# Patient Record
Sex: MALE | Race: WHITE | NOT HISPANIC OR LATINO | URBAN - METROPOLITAN AREA
[De-identification: names, ages, dates, MRNs, and addresses within clinical notes are randomized per-mention and may not be internally consistent; named-entity substitution may affect disease eponyms.]

---

## 2024-08-13 ENCOUNTER — TELEPHONE (OUTPATIENT)
Dept: TRANSPLANT | Facility: CLINIC | Age: 44
End: 2024-08-13
Payer: MEDICARE

## 2024-08-13 DIAGNOSIS — I50.9 CONGESTIVE HEART FAILURE, UNSPECIFIED HF CHRONICITY, UNSPECIFIED HEART FAILURE TYPE: Primary | ICD-10-CM

## 2024-10-30 ENCOUNTER — OFFICE VISIT (OUTPATIENT)
Dept: CARDIOLOGY | Facility: CLINIC | Age: 44
End: 2024-10-30
Payer: MEDICARE

## 2024-10-30 ENCOUNTER — LAB VISIT (OUTPATIENT)
Dept: LAB | Facility: HOSPITAL | Age: 44
End: 2024-10-30
Attending: INTERNAL MEDICINE
Payer: MEDICARE

## 2024-10-30 VITALS
BODY MASS INDEX: 33.56 KG/M2 | OXYGEN SATURATION: 96 % | WEIGHT: 247.81 LBS | HEIGHT: 72 IN | DIASTOLIC BLOOD PRESSURE: 80 MMHG | SYSTOLIC BLOOD PRESSURE: 130 MMHG | HEART RATE: 60 BPM

## 2024-10-30 DIAGNOSIS — I48.0 PAROXYSMAL ATRIAL FIBRILLATION: Primary | ICD-10-CM

## 2024-10-30 DIAGNOSIS — I50.9 CONGESTIVE HEART FAILURE, UNSPECIFIED HF CHRONICITY, UNSPECIFIED HEART FAILURE TYPE: ICD-10-CM

## 2024-10-30 DIAGNOSIS — I50.20 HFREF (HEART FAILURE WITH REDUCED EJECTION FRACTION): Primary | ICD-10-CM

## 2024-10-30 LAB
ALBUMIN SERPL BCP-MCNC: 3.5 G/DL (ref 3.5–5.2)
ALP SERPL-CCNC: 69 U/L (ref 40–150)
ALT SERPL W/O P-5'-P-CCNC: 41 U/L (ref 10–44)
ANION GAP SERPL CALC-SCNC: 7 MMOL/L (ref 8–16)
AST SERPL-CCNC: 34 U/L (ref 10–40)
BASOPHILS # BLD AUTO: 0.1 K/UL (ref 0–0.2)
BASOPHILS NFR BLD: 1.2 % (ref 0–1.9)
BILIRUB SERPL-MCNC: 0.6 MG/DL (ref 0.1–1)
BNP SERPL-MCNC: 114 PG/ML (ref 0–99)
BUN SERPL-MCNC: 41 MG/DL (ref 6–20)
CALCIUM SERPL-MCNC: 9 MG/DL (ref 8.7–10.5)
CHLORIDE SERPL-SCNC: 106 MMOL/L (ref 95–110)
CO2 SERPL-SCNC: 24 MMOL/L (ref 23–29)
CREAT SERPL-MCNC: 2.6 MG/DL (ref 0.5–1.4)
DIFFERENTIAL METHOD BLD: ABNORMAL
EOSINOPHIL # BLD AUTO: 0.3 K/UL (ref 0–0.5)
EOSINOPHIL NFR BLD: 3.5 % (ref 0–8)
ERYTHROCYTE [DISTWIDTH] IN BLOOD BY AUTOMATED COUNT: 13.6 % (ref 11.5–14.5)
EST. GFR  (NO RACE VARIABLE): 30.2 ML/MIN/1.73 M^2
GLUCOSE SERPL-MCNC: 93 MG/DL (ref 70–110)
HCT VFR BLD AUTO: 43.1 % (ref 40–54)
HGB BLD-MCNC: 13.3 G/DL (ref 14–18)
IMM GRANULOCYTES # BLD AUTO: 0.04 K/UL (ref 0–0.04)
IMM GRANULOCYTES NFR BLD AUTO: 0.5 % (ref 0–0.5)
LYMPHOCYTES # BLD AUTO: 2.4 K/UL (ref 1–4.8)
LYMPHOCYTES NFR BLD: 30.4 % (ref 18–48)
MCH RBC QN AUTO: 28.7 PG (ref 27–31)
MCHC RBC AUTO-ENTMCNC: 30.9 G/DL (ref 32–36)
MCV RBC AUTO: 93 FL (ref 82–98)
MONOCYTES # BLD AUTO: 0.6 K/UL (ref 0.3–1)
MONOCYTES NFR BLD: 7.5 % (ref 4–15)
NEUTROPHILS # BLD AUTO: 4.6 K/UL (ref 1.8–7.7)
NEUTROPHILS NFR BLD: 56.9 % (ref 38–73)
NRBC BLD-RTO: 0 /100 WBC
PLATELET # BLD AUTO: 357 K/UL (ref 150–450)
PMV BLD AUTO: 10.1 FL (ref 9.2–12.9)
POTASSIUM SERPL-SCNC: 4.7 MMOL/L (ref 3.5–5.1)
PROT SERPL-MCNC: 7.4 G/DL (ref 6–8.4)
RBC # BLD AUTO: 4.64 M/UL (ref 4.6–6.2)
SODIUM SERPL-SCNC: 137 MMOL/L (ref 136–145)
TSH SERPL DL<=0.005 MIU/L-ACNC: 1.71 UIU/ML (ref 0.4–4)
WBC # BLD AUTO: 8.03 K/UL (ref 3.9–12.7)

## 2024-10-30 PROCEDURE — 85025 COMPLETE CBC W/AUTO DIFF WBC: CPT | Mod: TXP | Performed by: INTERNAL MEDICINE

## 2024-10-30 PROCEDURE — 99214 OFFICE O/P EST MOD 30 MIN: CPT | Mod: GC,NTX,S$GLB, | Performed by: STUDENT IN AN ORGANIZED HEALTH CARE EDUCATION/TRAINING PROGRAM

## 2024-10-30 PROCEDURE — 80053 COMPREHEN METABOLIC PANEL: CPT | Mod: TXP | Performed by: INTERNAL MEDICINE

## 2024-10-30 PROCEDURE — 3079F DIAST BP 80-89 MM HG: CPT | Mod: CPTII,GC,NTX,S$GLB | Performed by: STUDENT IN AN ORGANIZED HEALTH CARE EDUCATION/TRAINING PROGRAM

## 2024-10-30 PROCEDURE — 83880 ASSAY OF NATRIURETIC PEPTIDE: CPT | Mod: TXP | Performed by: INTERNAL MEDICINE

## 2024-10-30 PROCEDURE — 84443 ASSAY THYROID STIM HORMONE: CPT | Mod: TXP | Performed by: INTERNAL MEDICINE

## 2024-10-30 PROCEDURE — 36415 COLL VENOUS BLD VENIPUNCTURE: CPT | Mod: NTX | Performed by: INTERNAL MEDICINE

## 2024-10-30 PROCEDURE — 4010F ACE/ARB THERAPY RXD/TAKEN: CPT | Mod: CPTII,GC,NTX,S$GLB | Performed by: STUDENT IN AN ORGANIZED HEALTH CARE EDUCATION/TRAINING PROGRAM

## 2024-10-30 PROCEDURE — 3008F BODY MASS INDEX DOCD: CPT | Mod: CPTII,GC,NTX,S$GLB | Performed by: STUDENT IN AN ORGANIZED HEALTH CARE EDUCATION/TRAINING PROGRAM

## 2024-10-30 PROCEDURE — 99999 PR PBB SHADOW E&M-EST. PATIENT-LVL IV: CPT | Mod: PBBFAC,GC,TXP, | Performed by: STUDENT IN AN ORGANIZED HEALTH CARE EDUCATION/TRAINING PROGRAM

## 2024-10-30 PROCEDURE — 3075F SYST BP GE 130 - 139MM HG: CPT | Mod: CPTII,GC,NTX,S$GLB | Performed by: STUDENT IN AN ORGANIZED HEALTH CARE EDUCATION/TRAINING PROGRAM

## 2024-10-30 RX ORDER — METOPROLOL SUCCINATE 25 MG/1
25 TABLET, EXTENDED RELEASE ORAL DAILY
COMMUNITY

## 2024-10-30 RX ORDER — AMIODARONE HYDROCHLORIDE 200 MG/1
200 TABLET ORAL 2 TIMES DAILY
COMMUNITY

## 2024-10-30 RX ORDER — DAPAGLIFLOZIN 10 MG/1
10 TABLET, FILM COATED ORAL EVERY MORNING
COMMUNITY

## 2024-10-30 RX ORDER — RIVAROXABAN 10 MG/1
10 TABLET, FILM COATED ORAL DAILY
COMMUNITY
Start: 2024-01-06

## 2024-10-30 RX ORDER — TAMSULOSIN HYDROCHLORIDE 0.4 MG/1
0.4 CAPSULE ORAL
COMMUNITY

## 2024-10-30 RX ORDER — ALBUTEROL SULFATE 90 UG/1
1 INHALANT RESPIRATORY (INHALATION) EVERY 4 HOURS PRN
COMMUNITY

## 2024-10-30 RX ORDER — ROSUVASTATIN CALCIUM 10 MG/1
1 TABLET, COATED ORAL DAILY
COMMUNITY
Start: 2020-05-12

## 2024-10-30 RX ORDER — DOXYCYCLINE 100 MG/1
100 CAPSULE ORAL 2 TIMES DAILY
COMMUNITY
Start: 2024-10-18 | End: 2024-11-01

## 2024-10-30 RX ORDER — SPIRONOLACTONE 25 MG/1
12.5 TABLET ORAL DAILY
COMMUNITY

## 2024-10-30 RX ORDER — BUMETANIDE 2 MG/1
2 TABLET ORAL DAILY
COMMUNITY
Start: 2024-07-19

## 2024-10-30 RX ORDER — POTASSIUM CHLORIDE 1500 MG/1
20 TABLET, EXTENDED RELEASE ORAL DAILY
COMMUNITY
Start: 2024-07-19

## 2024-10-30 RX ORDER — AZELASTINE 1 MG/ML
1 SPRAY, METERED NASAL
COMMUNITY

## 2024-11-01 ENCOUNTER — HOSPITAL ENCOUNTER (OUTPATIENT)
Dept: CARDIOLOGY | Facility: HOSPITAL | Age: 44
Discharge: HOME | End: 2024-11-01
Attending: INTERNAL MEDICINE
Payer: MEDICARE

## 2024-11-01 ENCOUNTER — HOSPITAL ENCOUNTER (OUTPATIENT)
Dept: PULMONOLOGY | Facility: CLINIC | Age: 44
Discharge: HOME | End: 2024-11-01
Payer: MEDICARE

## 2024-11-01 ENCOUNTER — CLINICAL SUPPORT (OUTPATIENT)
Dept: TRANSPLANT | Facility: CLINIC | Age: 44
End: 2024-11-01
Payer: MEDICARE

## 2024-11-01 ENCOUNTER — OFFICE VISIT (OUTPATIENT)
Dept: TRANSPLANT | Facility: CLINIC | Age: 44
End: 2024-11-01
Payer: MEDICARE

## 2024-11-01 VITALS
HEIGHT: 71 IN | BODY MASS INDEX: 34.57 KG/M2 | SYSTOLIC BLOOD PRESSURE: 130 MMHG | DIASTOLIC BLOOD PRESSURE: 80 MMHG | HEART RATE: 60 BPM | WEIGHT: 246.94 LBS

## 2024-11-01 VITALS
DIASTOLIC BLOOD PRESSURE: 77 MMHG | SYSTOLIC BLOOD PRESSURE: 117 MMHG | BODY MASS INDEX: 34.01 KG/M2 | HEART RATE: 60 BPM | WEIGHT: 251.13 LBS | HEIGHT: 72 IN

## 2024-11-01 VITALS — BODY MASS INDEX: 33.46 KG/M2 | WEIGHT: 247 LBS | HEIGHT: 72 IN

## 2024-11-01 DIAGNOSIS — Z95.810 CARDIAC RESYNCHRONIZATION THERAPY DEFIBRILLATOR (CRT-D) IN PLACE: ICD-10-CM

## 2024-11-01 DIAGNOSIS — I50.9 CONGESTIVE HEART FAILURE, UNSPECIFIED HF CHRONICITY, UNSPECIFIED HEART FAILURE TYPE: ICD-10-CM

## 2024-11-01 DIAGNOSIS — Z98.890 S/P ABLATION OF VENTRICULAR ARRHYTHMIA: ICD-10-CM

## 2024-11-01 DIAGNOSIS — I50.9 CONGESTIVE HEART FAILURE, UNSPECIFIED HF CHRONICITY, UNSPECIFIED HEART FAILURE TYPE: Primary | ICD-10-CM

## 2024-11-01 DIAGNOSIS — E11.9 TYPE 2 DIABETES MELLITUS WITHOUT COMPLICATION, UNSPECIFIED WHETHER LONG TERM INSULIN USE: ICD-10-CM

## 2024-11-01 DIAGNOSIS — N18.4 CKD (CHRONIC KIDNEY DISEASE), STAGE IV: ICD-10-CM

## 2024-11-01 DIAGNOSIS — Z86.79 S/P ABLATION OF VENTRICULAR ARRHYTHMIA: ICD-10-CM

## 2024-11-01 DIAGNOSIS — I70.0 AORTIC ATHEROSCLEROSIS: ICD-10-CM

## 2024-11-01 DIAGNOSIS — N18.30 STAGE 3 CHRONIC KIDNEY DISEASE, UNSPECIFIED WHETHER STAGE 3A OR 3B CKD: Primary | ICD-10-CM

## 2024-11-01 DIAGNOSIS — Z95.811 HEART REPLACED BY HEART ASSIST DEVICE: Primary | ICD-10-CM

## 2024-11-01 DIAGNOSIS — I42.0: ICD-10-CM

## 2024-11-01 DIAGNOSIS — I42.8 NICM (NONISCHEMIC CARDIOMYOPATHY): ICD-10-CM

## 2024-11-01 DIAGNOSIS — I50.42 CHRONIC COMBINED SYSTOLIC AND DIASTOLIC HEART FAILURE: ICD-10-CM

## 2024-11-01 DIAGNOSIS — Z86.718 HISTORY OF DEEP VEIN THROMBOSIS (DVT) OF LOWER EXTREMITY: ICD-10-CM

## 2024-11-01 DIAGNOSIS — I10 PRIMARY HYPERTENSION: ICD-10-CM

## 2024-11-01 LAB
ASCENDING AORTA: 2.87 CM
AV AREA BY CONTINUOUS VTI: 3.6 CM2
AV INDEX (PROSTH): 0.73
AV LVOT MEAN GRADIENT: 1 MMHG
AV LVOT PEAK GRADIENT: 2 MMHG
AV MEAN GRADIENT: 2.8 MMHG
AV PEAK GRADIENT: 4.8 MMHG
AV VALVE AREA BY VELOCITY RATIO: 3.1 CM²
AV VALVE AREA: 3.6 CM2
AV VELOCITY RATIO: 0.64
BSA FOR ECHO PROCEDURE: 2.37 M2
CV ECHO LV RWT: 0.24 CM
DOP CALC AO PEAK VEL: 1.1 M/S
DOP CALC AO VTI: 19.7 CM
DOP CALC LVOT AREA: 4.9 CM2
DOP CALC LVOT DIAMETER: 2.5 CM
DOP CALC LVOT PEAK VEL: 0.7 M/S
DOP CALC LVOT STROKE VOLUME: 70.2 CM3
DOP CALCLVOT PEAK VEL VTI: 14.3 CM
E WAVE DECELERATION TIME: 408.09 MS
E/A RATIO: 0.56
ECHO EF ESTIMATED: 31 %
ECHO LV POSTERIOR WALL: 0.8 CM (ref 0.6–1.1)
FRACTIONAL SHORTENING: 14.7 % (ref 28–44)
INTERVENTRICULAR SEPTUM: 1 CM (ref 0.6–1.1)
IVC DIAMETER: 1.26 CM
IVRT: 247.38 MS
LA MAJOR: 6.12 CM
LA MINOR: 5.73 CM
LA WIDTH: 3.58 CM
LEFT ATRIUM SIZE: 4.39 CM
LEFT ATRIUM VOLUME INDEX MOD: 26.6 ML/M2
LEFT ATRIUM VOLUME INDEX: 34.4 ML/M2
LEFT ATRIUM VOLUME MOD: 61.09 ML
LEFT ATRIUM VOLUME: 79.06 CM3
LEFT INTERNAL DIMENSION IN SYSTOLE: 5.8 CM (ref 2.1–4)
LEFT VENTRICLE DIASTOLIC VOLUME INDEX: 103.8 ML/M2
LEFT VENTRICLE DIASTOLIC VOLUME: 238.74 ML
LEFT VENTRICLE MASS INDEX: 116.6 G/M2
LEFT VENTRICLE SYSTOLIC VOLUME INDEX: 71.5 ML/M2
LEFT VENTRICLE SYSTOLIC VOLUME: 164.42 ML
LEFT VENTRICULAR INTERNAL DIMENSION IN DIASTOLE: 6.8 CM (ref 3.5–6)
LEFT VENTRICULAR MASS: 268.2 G
LV SEPTAL E/E' RATIO: 10
MV A" WAVE DURATION": 186.49 MS
MV PEAK A VEL: 0.72 M/S
MV PEAK E VEL: 0.4 M/S
OHS CV RV/LV RATIO: 0.51 CM
PISA TR MAX VEL: 1.79 M/S
PULM VEIN A" WAVE DURATION": 186.49 MS
PULM VEIN S/D RATIO: 1.62
PULMONIC VEIN PEAK A VELOCITY: 0.2 M/S
PV PEAK D VEL: 0.26 M/S
PV PEAK S VEL: 0.42 M/S
RA MAJOR: 4.65 CM
RA PRESSURE ESTIMATED: 3 MMHG
RA WIDTH: 3.9 CM
RIGHT ATRIAL AREA: 14.2 CM2
RIGHT VENTRICLE DIASTOLIC BASEL DIMENSION: 3.5 CM
RV TB RVSP: 5 MMHG
RV TISSUE DOPPLER FREE WALL SYSTOLIC VELOCITY 1 (APICAL 4 CHAMBER VIEW): 13.04 CM/S
SINUS: 2.94 CM
STJ: 2.6 CM
TDI SEPTAL: 0.04 M/S
TR MAX PG: 13 MMHG
TRICUSPID ANNULAR PLANE SYSTOLIC EXCURSION: 1.8 CM
TV PEAK GRADIENT: 13 MMHG
TV REST PULMONARY ARTERY PRESSURE: 16 MMHG
Z-SCORE OF LEFT VENTRICULAR DIMENSION IN END DIASTOLE: -2.7
Z-SCORE OF LEFT VENTRICULAR DIMENSION IN END SYSTOLE: 0.6

## 2024-11-01 PROCEDURE — 25500020 PHARM REV CODE 255: Mod: TXP | Performed by: INTERNAL MEDICINE

## 2024-11-01 PROCEDURE — C8929 TTE W OR WO FOL WCON,DOPPLER: HCPCS | Mod: TXP

## 2024-11-01 PROCEDURE — 93306 TTE W/DOPPLER COMPLETE: CPT | Mod: 26,NTX,, | Performed by: INTERNAL MEDICINE

## 2024-11-01 PROCEDURE — 99999 PR PBB SHADOW E&M-EST. PATIENT-LVL IV: CPT | Mod: PBBFAC,TXP,, | Performed by: INTERNAL MEDICINE

## 2024-11-01 RX ADMIN — HUMAN ALBUMIN MICROSPHERES AND PERFLUTREN 0.11 MG: 10; .22 INJECTION, SOLUTION INTRAVENOUS at 02:11

## 2024-11-04 ENCOUNTER — PATIENT MESSAGE (OUTPATIENT)
Dept: TRANSPLANT | Facility: CLINIC | Age: 44
End: 2024-11-04
Payer: MEDICARE

## 2024-11-04 DIAGNOSIS — I50.9 CONGESTIVE HEART FAILURE, UNSPECIFIED HF CHRONICITY, UNSPECIFIED HEART FAILURE TYPE: Primary | ICD-10-CM

## 2024-11-06 NOTE — PROGRESS NOTES
"  Subjective     Chief Complaint: HFrEF    History of Present Illness:  Mr. Carroll Yun is a 44 y.o. male male with past medical history of nonischemic cardiomyopathy (gene positive with MYBPC3), CRT D, coronary calcifications noted on imaging, chronic kidney disease stage 3, hypertension, VT VF status post ablation December of 2023, DVT on anticoagulation who presents to establish care.  Unclear why  patient was referred to general cardiology as he has an upcoming appointment with heart failure transplant service for advanced heart failure management.  He currently lives in [facility] but had establish care with heart failure transplant at [facility].  However he is planning on moving to [place] so is attempting to establish care with [facility].  He reports numerous admissions over the past year for decompensated heart failure requiring inotropes.  Over the past 2-3 months he reports he has been doing well.  He he has no orthopnea, weight gain, lower extremity edema.  States he can walk indefinitely on flat ground at this time.  He is a prior smoker.  He does not drink.           PAST HISTORY:     No past medical history on file.    Cardiac History   No results found for this or any previous visit.    No results found for: "EF"  No results found for this or any previous visit.      MEDICATIONS:     Current Outpatient Medications on File Prior to Visit   Medication Sig    albuterol (PROVENTIL/VENTOLIN HFA) 90 mcg/actuation inhaler Inhale 1 puff into the lungs every 4 (four) hours as needed.    amiodarone (PACERONE) 200 MG Tab Take 200 mg by mouth 2 (two) times daily. (Patient taking differently: Take 200 mg by mouth 2 (two) times daily. Pt states breaks tab in half to take 100 mg daily)    azelastine (ASTELIN) 137 mcg (0.1 %) nasal spray 1 spray by Nasal route.    bumetanide (BUMEX) 2 MG tablet Take 2 mg by mouth once daily.    FARXIGA 10 mg tablet Take 10 mg by mouth every morning.    metoprolol " succinate (TOPROL-XL) 25 MG 24 hr tablet Take 25 mg by mouth once daily.    potassium chloride (K-TAB) 20 mEq Take 20 mEq by mouth Daily.    rosuvastatin (CRESTOR) 10 MG tablet Take 1 tablet by mouth once daily.    spironolactone (ALDACTONE) 25 MG tablet Take 12.5 mg by mouth once daily.    tamsulosin (FLOMAX) 0.4 mg Cap Take 0.4 mg by mouth.    XARELTO 10 mg Tab Take 10 mg by mouth Daily.     No current facility-administered medications on file prior to visit.       SUBJECTIVE:     Review of Systems   Constitutional:  Negative for malaise/fatigue.   Respiratory:  Negative for cough and shortness of breath.    Cardiovascular:  Negative for chest pain, palpitations, orthopnea, claudication, leg swelling and PND.        OBJECTIVE:     Vital Signs:  Vitals:    10/30/24 1323   BP: 130/80   Pulse: 60   SpO2: 96%   Weight: 112.4 kg (247 lb 12.8 oz)   Height: 6' (1.829 m)     Body mass index is 33.61 kg/m².     Physical Exam  HENT:      Head: Normocephalic and atraumatic.   Eyes:      Conjunctiva/sclera: Conjunctivae normal.   Neck:      Comments: No JVD at 30°  Cardiovascular:      Rate and Rhythm: Normal rate and regular rhythm.      Heart sounds: Normal heart sounds.   Pulmonary:      Effort: Pulmonary effort is normal. No respiratory distress.      Breath sounds: Normal breath sounds. No wheezing.   Abdominal:      General: There is no distension.      Palpations: Abdomen is soft.      Tenderness: There is no abdominal tenderness.   Musculoskeletal:      Cervical back: Normal range of motion.      Right lower leg: No edema.      Left lower leg: No edema.   Neurological:      Mental Status: He is alert and oriented to person, place, and time.   Psychiatric:         Mood and Affect: Affect normal.         Laboratory  Lab Results   Component Value Date    WBC 8.03 10/30/2024    HGB 13.3 (L) 10/30/2024    HCT 43.1 10/30/2024    MCV 93 10/30/2024     10/30/2024     BMP  Lab Results   Component Value Date      "10/30/2024    K 4.7 10/30/2024     10/30/2024    CO2 24 10/30/2024    BUN 41 (H) 10/30/2024    CREATININE 2.6 (H) 10/30/2024    CALCIUM 9.0 10/30/2024    ANIONGAP 7 (L) 10/30/2024    EGFRNORACEVR 30.2 (A) 10/30/2024     No results found for: "INR", "PROTIME"  No results found for: "HGBA1C"  No results for input(s): "POCTGLUCOSE" in the last 72 hours.    Diagnostic Results:      ASSESSMENT & PLAN:     Nonischemic cardiomyopathy  Patient is euvolemic on exam and currently asymptomatic from a heart failure standpoint  GDMT with Toprol 25 mg daily, Farxiga 10 mg daily, and Aldactone 25 mg daily.  By currently on Ace/Arb (reports issues with kidney function and hypotension went on Entresto in the past)  Will obtain basic labs prior to med adjustment and obtain echocardiogram for system  He has upcoming appointment with heart failure transplant to establish care with them      Discussed with Dr. Pruitt  - staff attestation to follow      Russel Dickinson MD  Cardiovascular Disease Fellow PGY-6      "

## 2024-11-08 ENCOUNTER — PATIENT MESSAGE (OUTPATIENT)
Dept: TRANSPLANT | Facility: CLINIC | Age: 44
End: 2024-11-08
Payer: MEDICARE

## 2024-11-08 ENCOUNTER — TELEPHONE (OUTPATIENT)
Dept: TRANSPLANT | Facility: CLINIC | Age: 44
End: 2024-11-08
Payer: MEDICARE

## 2024-11-08 LAB
EXT ALBUMIN: 4.3
EXT ALKALINE PHOSPHATASE: 66
EXT ALT: 39
EXT AST: 35
EXT BILIRUBIN TOTAL: 0.9
EXT BUN: 39
EXT CALCIUM: 8.9
EXT CHLORIDE: 101
EXT CREATININE: 2.9 MG/DL
EXT GLUCOSE: 120
EXT POTASSIUM: 4.8
EXT PROTEIN TOTAL: 7.3
EXT SODIUM: 133 MMOL/L

## 2024-11-08 NOTE — TELEPHONE ENCOUNTER
"Repeat CMP results received and reviewed. Creatinine elevated to 2.9 (from 2.6 previously). Otherwise results are consistent with recent trends.       NN spoke with briefly spoke with patient while he was at work. Patient confirms making medication changes. He states that he started the Entresto 24-26 mg BID and reduce Spironolactone to 12.5 mg on 11/5/2024 and continues to take the Bumex 2 mg daily. When asked about fluid balance, patient states "I could be holding on to a little more fluid. I returned to work, and I tend to carry more fluid when I am working" but otherwise denies SOB or concerns.     Will review labs with Dr. Kessler and update patient with any new orders/recommendations as needed.     "

## 2024-11-11 ENCOUNTER — HOSPITAL ENCOUNTER (OUTPATIENT)
Dept: CARDIOLOGY | Facility: HOSPITAL | Age: 44
Discharge: HOME | End: 2024-11-11
Attending: INTERNAL MEDICINE
Payer: MEDICARE

## 2024-11-11 VITALS
DIASTOLIC BLOOD PRESSURE: 67 MMHG | HEIGHT: 72 IN | BODY MASS INDEX: 34.81 KG/M2 | WEIGHT: 257 LBS | SYSTOLIC BLOOD PRESSURE: 107 MMHG | HEART RATE: 64 BPM

## 2024-11-11 DIAGNOSIS — I50.9 CONGESTIVE HEART FAILURE, UNSPECIFIED HF CHRONICITY, UNSPECIFIED HEART FAILURE TYPE: ICD-10-CM

## 2024-11-11 LAB
CV STRESS BASE HR: 64 BPM
DIASTOLIC BLOOD PRESSURE: 67 MMHG
OHS CV CPX 1 MINUTE RECOVERY HEART RATE: 101 BPM
OHS CV CPX 85 PERCENT MAX PREDICTED HEART RATE MALE: 150
OHS CV CPX ANAEROBIC THRESHOLD DIASTOLIC BLOOD PRESSURE: 56 MMHG
OHS CV CPX ANAEROBIC THRESHOLD HEART RATE: 90
OHS CV CPX ANAEROBIC THRESHOLD RATE PRESSURE PRODUCT: 8820
OHS CV CPX ANAEROBIC THRESHOLD SYSTOLIC BLOOD PRESSURE: 98
OHS CV CPX DATA GRADE - AT: 10.4
OHS CV CPX DATA GRADE - PEAK: 14.1
OHS CV CPX DATA O2 SAT - PEAK: 99
OHS CV CPX DATA O2 SAT - REST: 97
OHS CV CPX DATA SPEED - AT: 3.1
OHS CV CPX DATA SPEED - PEAK: 3.6
OHS CV CPX DATA TIME - AT: 5.1
OHS CV CPX DATA TIME - PEAK: 6.63
OHS CV CPX DATA VE/VCO2 - AT: 33
OHS CV CPX DATA VE/VCO2 - PEAK: 33
OHS CV CPX DATA VE/VO2 - AT: 29
OHS CV CPX DATA VE/VO2 - PEAK: 33
OHS CV CPX DATA VO2 - AT: 12
OHS CV CPX DATA VO2 - PEAK: 16.2
OHS CV CPX DATA VO2 - REST: 3.9
OHS CV CPX FEV1/FVC: 0.63
OHS CV CPX FORCED EXPIRATORY VOLUME: 3.28
OHS CV CPX FORCED VITAL CAPACITY (FVC): 5.19
OHS CV CPX HIGHEST VO: 40.4
OHS CV CPX MAX PREDICTED HEART RATE: 176
OHS CV CPX MAXIMAL VOLUNTARY VENTILATION (MVV) PREDICTED: 131.2
OHS CV CPX MAXIMAL VOLUNTARY VENTILATION (MVV): 50
OHS CV CPX MAXIUMUM EXERCISE VENTILATION (VE MAX): 72.2
OHS CV CPX PATIENT AGE: 44
OHS CV CPX PATIENT HEIGHT IN: 72
OHS CV CPX PATIENT IS FEMALE AGE 11-19: 0
OHS CV CPX PATIENT IS FEMALE AGE GREATER THAN 19: 0
OHS CV CPX PATIENT IS FEMALE AGE LESS THAN 11: 0
OHS CV CPX PATIENT IS FEMALE: 0
OHS CV CPX PATIENT IS MALE AGE 11-25: 0
OHS CV CPX PATIENT IS MALE AGE GREATER THAN 25: 1
OHS CV CPX PATIENT IS MALE AGE LESS THAN 11: 0
OHS CV CPX PATIENT IS MALE GREATER THAN 18: 1
OHS CV CPX PATIENT IS MALE LESS THAN OR EQUAL TO 18: 0
OHS CV CPX PATIENT IS MALE: 1
OHS CV CPX PATIENT WEIGHT RETURNED IN OZ: 4112
OHS CV CPX PEAK DIASTOLIC BLOOD PRESSURE: 62 MMHG
OHS CV CPX PEAK HEAR RATE: 112 BPM
OHS CV CPX PEAK RATE PRESSURE PRODUCT: NORMAL
OHS CV CPX PEAK SYSTOLIC BLOOD PRESSURE: 102 MMHG
OHS CV CPX PERCENT BODY FAT: 14.6
OHS CV CPX PERCENT MAX PREDICTED HEART RATE ACHIEVED: 64
OHS CV CPX PREDICTED VO2: 40.4 ML/KG/MIN
OHS CV CPX RATE PRESSURE PRODUCT PRESENTING: 6848
OHS CV CPX REST PET CO2: 27
OHS CV CPX VE/VCO2 SLOPE: 27.7
STRESS ECHO POST EXERCISE DUR MIN: 6 MINUTES
STRESS ECHO POST EXERCISE DUR SEC: 38 SECONDS
SYSTOLIC BLOOD PRESSURE: 107 MMHG

## 2024-11-11 PROCEDURE — 94621 CARDIOPULM EXERCISE TESTING: CPT | Mod: 26,NTX,, | Performed by: INTERNAL MEDICINE

## 2024-11-11 PROCEDURE — 94621 CARDIOPULM EXERCISE TESTING: CPT | Mod: TXP

## 2024-11-12 ENCOUNTER — DOCUMENTATION ONLY (OUTPATIENT)
Dept: TRANSPLANT | Facility: CLINIC | Age: 44
End: 2024-11-12
Payer: MEDICARE

## 2024-11-12 NOTE — PROGRESS NOTES
Care of patient is being transferred to CHF section from Preht section, per Dr. Kessler.    Dx: NICM, HFrEF 20-25%  Reason: Sent to Medical Management    Pt is not on home inotrope therapy.      Outstanding orders scheduled:    Future Appointments   Date Time Provider Department Center   12/9/2024  3:30 PM Kevin Kessler MD [facility] HEARTTX [facility]   12/13/2024  1:40 PM Felix Zaman MD [facility] CARDIO [facility]   12/26/2024 12:15 PM EKG, APPT [facility] EKG [facility]   12/26/2024 12:40 PM COORDINATED DEVICE CHECK [facility] ARRHPRO [facility]   12/26/2024  1:00 PM RANDY Fajardo MD [facility] ARRHYTH [facility]

## 2024-11-13 ENCOUNTER — TELEPHONE (OUTPATIENT)
Dept: TRANSPLANT | Facility: CLINIC | Age: 44
End: 2024-11-13
Payer: MEDICARE

## 2024-11-14 ENCOUNTER — LAB VISIT (OUTPATIENT)
Dept: LAB | Facility: HOSPITAL | Age: 44
End: 2024-11-14
Payer: MEDICARE

## 2024-11-14 DIAGNOSIS — I50.9 CONGESTIVE HEART FAILURE, UNSPECIFIED HF CHRONICITY, UNSPECIFIED HEART FAILURE TYPE: ICD-10-CM

## 2024-11-14 LAB
ALBUMIN SERPL BCP-MCNC: 3.6 G/DL (ref 3.5–5.2)
ALP SERPL-CCNC: 69 U/L (ref 40–150)
ALT SERPL W/O P-5'-P-CCNC: 24 U/L (ref 10–44)
ANION GAP SERPL CALC-SCNC: 10 MMOL/L (ref 8–16)
AST SERPL-CCNC: 25 U/L (ref 10–40)
BASOPHILS # BLD AUTO: 0.06 K/UL (ref 0–0.2)
BASOPHILS NFR BLD: 0.9 % (ref 0–1.9)
BILIRUB SERPL-MCNC: 0.8 MG/DL (ref 0.1–1)
BNP SERPL-MCNC: 199 PG/ML (ref 0–99)
BUN SERPL-MCNC: 37 MG/DL (ref 6–20)
CALCIUM SERPL-MCNC: 9 MG/DL (ref 8.7–10.5)
CHLORIDE SERPL-SCNC: 105 MMOL/L (ref 95–110)
CO2 SERPL-SCNC: 24 MMOL/L (ref 23–29)
CREAT SERPL-MCNC: 2.2 MG/DL (ref 0.5–1.4)
DIFFERENTIAL METHOD BLD: ABNORMAL
EOSINOPHIL # BLD AUTO: 0.2 K/UL (ref 0–0.5)
EOSINOPHIL NFR BLD: 3.2 % (ref 0–8)
ERYTHROCYTE [DISTWIDTH] IN BLOOD BY AUTOMATED COUNT: 13.7 % (ref 11.5–14.5)
EST. GFR  (NO RACE VARIABLE): 37 ML/MIN/1.73 M^2
GLUCOSE SERPL-MCNC: 86 MG/DL (ref 70–110)
HCT VFR BLD AUTO: 45 % (ref 40–54)
HGB BLD-MCNC: 13.7 G/DL (ref 14–18)
IMM GRANULOCYTES # BLD AUTO: 0.01 K/UL (ref 0–0.04)
IMM GRANULOCYTES NFR BLD AUTO: 0.1 % (ref 0–0.5)
LYMPHOCYTES # BLD AUTO: 1.9 K/UL (ref 1–4.8)
LYMPHOCYTES NFR BLD: 26.9 % (ref 18–48)
MCH RBC QN AUTO: 28 PG (ref 27–31)
MCHC RBC AUTO-ENTMCNC: 30.4 G/DL (ref 32–36)
MCV RBC AUTO: 92 FL (ref 82–98)
MONOCYTES # BLD AUTO: 0.5 K/UL (ref 0.3–1)
MONOCYTES NFR BLD: 7.7 % (ref 4–15)
NEUTROPHILS # BLD AUTO: 4.2 K/UL (ref 1.8–7.7)
NEUTROPHILS NFR BLD: 61.2 % (ref 38–73)
NRBC BLD-RTO: 0 /100 WBC
PLATELET # BLD AUTO: 338 K/UL (ref 150–450)
PMV BLD AUTO: 9.8 FL (ref 9.2–12.9)
POTASSIUM SERPL-SCNC: 4.7 MMOL/L (ref 3.5–5.1)
PROT SERPL-MCNC: 7.5 G/DL (ref 6–8.4)
RBC # BLD AUTO: 4.89 M/UL (ref 4.6–6.2)
SODIUM SERPL-SCNC: 139 MMOL/L (ref 136–145)
TSH SERPL DL<=0.005 MIU/L-ACNC: 1.99 UIU/ML (ref 0.4–4)
WBC # BLD AUTO: 6.88 K/UL (ref 3.9–12.7)

## 2024-11-14 PROCEDURE — 84443 ASSAY THYROID STIM HORMONE: CPT | Performed by: INTERNAL MEDICINE

## 2024-11-14 PROCEDURE — 80053 COMPREHEN METABOLIC PANEL: CPT | Performed by: INTERNAL MEDICINE

## 2024-11-14 PROCEDURE — 36415 COLL VENOUS BLD VENIPUNCTURE: CPT | Mod: PO | Performed by: INTERNAL MEDICINE

## 2024-11-14 PROCEDURE — 83880 ASSAY OF NATRIURETIC PEPTIDE: CPT | Performed by: INTERNAL MEDICINE

## 2024-11-14 PROCEDURE — 85025 COMPLETE CBC W/AUTO DIFF WBC: CPT | Mod: PO | Performed by: INTERNAL MEDICINE

## 2024-12-01 ENCOUNTER — OFFICE VISIT (OUTPATIENT)
Dept: URGENT CARE | Facility: CLINIC | Age: 44
End: 2024-12-01
Payer: MEDICARE

## 2024-12-01 VITALS
HEART RATE: 99 BPM | SYSTOLIC BLOOD PRESSURE: 151 MMHG | RESPIRATION RATE: 16 BRPM | TEMPERATURE: 98 F | DIASTOLIC BLOOD PRESSURE: 98 MMHG | OXYGEN SATURATION: 98 %

## 2024-12-01 DIAGNOSIS — R30.0 DYSURIA: Primary | ICD-10-CM

## 2024-12-01 DIAGNOSIS — R10.84 GENERALIZED ABDOMINAL PAIN: ICD-10-CM

## 2024-12-01 DIAGNOSIS — R81 GLUCOSE FOUND IN URINE ON EXAMINATION: ICD-10-CM

## 2024-12-01 DIAGNOSIS — R10.9 ABDOMINAL PAIN, UNSPECIFIED ABDOMINAL LOCATION: ICD-10-CM

## 2024-12-01 LAB
BILIRUBIN, UA POC OHS: NEGATIVE
BLOOD, UA POC OHS: NEGATIVE
CLARITY, UA POC OHS: CLEAR
COLOR, UA POC OHS: YELLOW
GLUCOSE SERPL-MCNC: 116 MG/DL (ref 70–110)
GLUCOSE, UA POC OHS: >=1000
KETONES, UA POC OHS: NEGATIVE
LEUKOCYTES, UA POC OHS: NEGATIVE
NITRITE, UA POC OHS: NEGATIVE
PH, UA POC OHS: 5
PROTEIN, UA POC OHS: NEGATIVE
SPECIFIC GRAVITY, UA POC OHS: 1.01
UROBILINOGEN, UA POC OHS: 0.2

## 2024-12-01 PROCEDURE — 74019 RADEX ABDOMEN 2 VIEWS: CPT | Mod: S$GLB,,, | Performed by: STUDENT IN AN ORGANIZED HEALTH CARE EDUCATION/TRAINING PROGRAM

## 2024-12-01 PROCEDURE — 87086 URINE CULTURE/COLONY COUNT: CPT | Performed by: NURSE PRACTITIONER

## 2024-12-01 PROCEDURE — 87088 URINE BACTERIA CULTURE: CPT | Performed by: NURSE PRACTITIONER

## 2024-12-01 RX ORDER — CEPHALEXIN 500 MG/1
500 CAPSULE ORAL 4 TIMES DAILY
Qty: 28 CAPSULE | Refills: 0 | Status: SHIPPED | OUTPATIENT
Start: 2024-12-01 | End: 2024-12-08

## 2024-12-01 NOTE — PATIENT INSTRUCTIONS
Drink plenty of fluid.  Discussed if pain persist or gets worse to go the ED.  CT scan tomorrow.   Follow up with PCP/Nephrology  Tylenol for Fever or chills

## 2024-12-01 NOTE — PROGRESS NOTES
Subjective:      Patient ID: Carroll Yun is a 44 y.o. male.    Vitals:  vitals were not taken for this visit.     Chief Complaint: Dysuria    Dysuria     Genitourinary:  Positive for dysuria.    Objective:     Physical Exam    Assessment:     No diagnosis found.    Plan:       There are no diagnoses linked to this encounter.

## 2024-12-01 NOTE — PROGRESS NOTES
Subjective:      Patient ID: Carroll Yun is a 44 y.o. male.    Vitals:  temperature is 97.6 °F (36.4 °C). His blood pressure is 151/98 (abnormal) and his pulse is 99. His respiration is 16 and oxygen saturation is 98%.     Chief Complaint: Dysuria    Pt reports to clinic with possible uti. Pt having urinary frequency, urinary urgency, abdominal pain, back pain onset couple days ago. Not using medication for symptoms. Pain rated 5/10.  Provider Note;  Hx of CHF and heart transplant work up. States has kidney stones but no need to worry about them    Dysuria   This is a new problem. The current episode started in the past 7 days. The problem occurs every urination. The problem has been unchanged. The pain is at a severity of 5/10. The pain is moderate. There has been no fever. The fever has been present for Less than 1 day. Associated symptoms include frequency and urgency. He has tried nothing for the symptoms. The treatment provided no relief.       Genitourinary:  Positive for dysuria, frequency and urgency.      Objective:     Physical Exam   Constitutional: He is oriented to person, place, and time. He appears well-developed. He is cooperative.   HENT:   Head: Normocephalic and atraumatic.   Ears:   Right Ear: Hearing, tympanic membrane, external ear and ear canal normal.   Left Ear: Hearing, tympanic membrane, external ear and ear canal normal.   Nose: Nose normal. No mucosal edema or nasal deformity. No epistaxis. Right sinus exhibits no maxillary sinus tenderness and no frontal sinus tenderness. Left sinus exhibits no maxillary sinus tenderness and no frontal sinus tenderness.   Mouth/Throat: Uvula is midline, oropharynx is clear and moist and mucous membranes are normal. No trismus in the jaw. Normal dentition. No uvula swelling.   Eyes: Conjunctivae and lids are normal.   Neck: Trachea normal and phonation normal. Neck supple.   Cardiovascular: Normal rate, regular rhythm, normal heart sounds and normal  pulses.   Pulmonary/Chest: Effort normal and breath sounds normal.   Abdominal: Normal appearance and bowel sounds are normal. Soft. There is left CVA tenderness and right CVA tenderness.   Musculoskeletal: Normal range of motion.         General: Normal range of motion.   Neurological: He is alert and oriented to person, place, and time. He exhibits normal muscle tone.   Skin: Skin is warm, dry and intact.   Psychiatric: His speech is normal and behavior is normal. Judgment and thought content normal.   Nursing note and vitals reviewed.      Assessment:     1. Dysuria    2. Glucose found in urine on examination    3. Generalized abdominal pain    4. Abdominal pain, unspecified abdominal location      Results for orders placed or performed in visit on 12/01/24   POCT Urinalysis(Instrument)    Collection Time: 12/01/24 12:55 PM   Result Value Ref Range    Color, POC UA Yellow Yellow, Straw, Colorless    Clarity, POC UA Clear Clear    Glucose, POC UA >=1000 (A) Negative    Bilirubin, POC UA Negative Negative    Ketones, POC UA Negative Negative    Spec Grav POC UA 1.010 1.005 - 1.030    Blood, POC UA Negative Negative    pH, POC UA 5.0 5.0 - 8.0    Protein, POC UA Negative Negative    Urobilinogen, POC UA 0.2 <=1.0    Nitrite, POC UA Negative Negative    WBC, POC UA Negative Negative   POCT Glucose, Hand-Held Device    Collection Time: 12/01/24  1:22 PM   Result Value Ref Range    POC Glucose 116 (A) 70 - 110 MG/DL      Attempted to do a [facility] but no answer 28 mins.    EXAMINATION:  XR ABDOMEN FLAT AND ERECT     CLINICAL HISTORY:  Generalized abdominal pain     TECHNIQUE:  Flat and erect AP views of the abdomen were performed.     COMPARISON:  None     FINDINGS:  Nonobstructive bowel gas pattern.  No dilated loops of bowel.  No free intraperitoneal air detected.  No renal calcification.  No acute osseous abnormality.     Impression:     As above.  Plan:       Dysuria  -     POCT  Urinalysis(Instrument)  -     POCT Glucose, Hand-Held Device  -     cephALEXin (KEFLEX) 500 MG capsule; Take 1 capsule (500 mg total) by mouth 4 (four) times daily. for 7 days  Dispense: 28 capsule; Refill: 0  -     Urine culture    Glucose found in urine on examination  -     POCT Glucose, Hand-Held Device    Generalized abdominal pain  -     X-Ray Abdomen Flat And Erect    Abdominal pain, unspecified abdominal location  -     CT Abdomen Pelvis  Without Contrast; Future; Expected date: 12/02/2024    Drink plenty of fluid.  Discussed if pain persist or gets worse to go the ED.  CT scan tomorrow.   Follow up with PCP/Nephrology  Tylenol for Fever or chills

## 2024-12-03 LAB — BACTERIA UR CULT: ABNORMAL

## 2024-12-11 ENCOUNTER — TELEPHONE (OUTPATIENT)
Dept: TRANSPLANT | Facility: CLINIC | Age: 44
End: 2024-12-11
Payer: MEDICARE

## 2024-12-13 ENCOUNTER — OFFICE VISIT (OUTPATIENT)
Dept: TRANSPLANT | Facility: CLINIC | Age: 44
End: 2024-12-13
Payer: MEDICARE

## 2024-12-13 ENCOUNTER — OFFICE VISIT (OUTPATIENT)
Dept: CARDIOLOGY | Facility: CLINIC | Age: 44
End: 2024-12-13
Payer: MEDICARE

## 2024-12-13 VITALS
HEART RATE: 60 BPM | SYSTOLIC BLOOD PRESSURE: 115 MMHG | DIASTOLIC BLOOD PRESSURE: 59 MMHG | HEIGHT: 72 IN | BODY MASS INDEX: 35.48 KG/M2 | HEIGHT: 72 IN | BODY MASS INDEX: 35.03 KG/M2 | WEIGHT: 258.63 LBS | HEART RATE: 63 BPM | OXYGEN SATURATION: 95 % | WEIGHT: 261.94 LBS | DIASTOLIC BLOOD PRESSURE: 64 MMHG | SYSTOLIC BLOOD PRESSURE: 105 MMHG

## 2024-12-13 DIAGNOSIS — I42.8 NICM (NONISCHEMIC CARDIOMYOPATHY): ICD-10-CM

## 2024-12-13 DIAGNOSIS — I50.9 CONGESTIVE HEART FAILURE, UNSPECIFIED HF CHRONICITY, UNSPECIFIED HEART FAILURE TYPE: ICD-10-CM

## 2024-12-13 DIAGNOSIS — Z95.810 CARDIAC RESYNCHRONIZATION THERAPY DEFIBRILLATOR (CRT-D) IN PLACE: ICD-10-CM

## 2024-12-13 DIAGNOSIS — I50.42 CHRONIC COMBINED SYSTOLIC AND DIASTOLIC HEART FAILURE: Primary | ICD-10-CM

## 2024-12-13 DIAGNOSIS — I47.20 VENTRICULAR TACHYCARDIA: ICD-10-CM

## 2024-12-13 DIAGNOSIS — Z95.810 CARDIAC RESYNCHRONIZATION THERAPY DEFIBRILLATOR (CRT-D) IN PLACE: Primary | ICD-10-CM

## 2024-12-13 DIAGNOSIS — N18.32 STAGE 3B CHRONIC KIDNEY DISEASE: ICD-10-CM

## 2024-12-13 DIAGNOSIS — I50.42 CHRONIC COMBINED SYSTOLIC AND DIASTOLIC HEART FAILURE: ICD-10-CM

## 2024-12-13 DIAGNOSIS — I10 HYPERTENSION, UNSPECIFIED TYPE: ICD-10-CM

## 2024-12-13 DIAGNOSIS — E78.5 HYPERLIPIDEMIA, UNSPECIFIED HYPERLIPIDEMIA TYPE: ICD-10-CM

## 2024-12-13 DIAGNOSIS — I42.0: ICD-10-CM

## 2024-12-13 DIAGNOSIS — Z86.718 HISTORY OF DVT OF LOWER EXTREMITY: ICD-10-CM

## 2024-12-13 PROBLEM — E11.22 TYPE 2 DIABETES MELLITUS WITH DIABETIC CHRONIC KIDNEY DISEASE: Chronic | Status: ACTIVE | Noted: 2023-10-02

## 2024-12-13 PROCEDURE — 99999 PR PBB SHADOW E&M-EST. PATIENT-LVL III: CPT | Mod: PBBFAC,,, | Performed by: INTERNAL MEDICINE

## 2024-12-13 NOTE — PROGRESS NOTES
Subjective   Patient ID:  Carroll Yun is a 44 y.o. male who presents for follow-up of Congestive Heart Failure    NICM diagnosed in 2015, HFrEF, LVEF 20-25%, LVEDD 6.8 cm, gene positive with MYBPC3 gene, s/p CRT-D, coronary calcifications on CT, HTN, CKD, recurrent VT/VF s/p ablation 12/2023, prior DVT on AC who presents for evaluation.     He was followed at [facility]. Was considered for advanced therapies there but deemed too stable for it at the time. Prior records available under scanned media and reviewed prior to visit. He moved to [facility] in established care with [facility] in 11/2024.    I saw him last month at which time he had class 1-2 symptoms and was doing well. Comes in today after completing CPX for risk stratification purposes.    Continues to do well. No CV complaints. With day-to-day activities denies chest discomfort, shortness of breath, leg swelling, palpitations, PND, orthopnea, presyncope, or syncope. No shocks from his defibrillator since his VT ablation, and no heart failure admissions within the past year.     PMH/FH/SH  Past medical history is significant for nonischemic cardiomyopathy as noted above, with gene positivity for MYBPC 3, s/p CRT-D, hypertension, chronic kidney disease, VT/VF status post ablation, and prior DVT.     No surgeries     No known family history of heart issues     He is a former smoker, having quit in 2020.  He smoked for approximately 10 years, averaging 1 pack per day when he was smoking.  Drinks alcohol seldom.  Denies any recreational drug use.        TTE 11/1/2024    Left Ventricle: The left ventricle is moderately dilated. Normal wall thickness. There is eccentric hypertrophy. Severe global hypokinesis present. There is severely reduced systolic function with a visually estimated ejection fraction of 20 - 25%. Grade I diastolic dysfunction. Normal left ventricular filling pressure.    Right Ventricle: Normal right ventricular cavity size. Wall thickness is  normal. Systolic function is normal. Pacemaker lead present in the ventricle.    Pulmonary Artery: The estimated pulmonary artery systolic pressure is 16 mmHg.    IVC/SVC: Normal venous pressure at 3 mmHg.  LVEDD 6.8 cm    CPX 11/11/24  Resting spirometry reveals an FVC = 5.19L which is 98% of predicted, an FEV1 of 3.28L, which is 75% of predicted and an FEV1/FVC ratio of 63%. The MVV = 131 L/min, which is 78% of predicted.     The respiratory exchange ratio (RER) was 1.1, suggesting an adequate effort.     The breathing reserve is calculated at 45%, which is normal. Oxygen saturation with exercise remained normal.     The peak VO2 was 16.2 ml/kg/min which is 40% of predicted equating to a functional capacity of 4.63 METS indicating severe functional impairment.     The anaerobic threshold (AT), which occurred at a heart rate of 90bpm, was 12.0 ml/kg/min, which is 30% of the predicted VO2 and is reduced.     The peak VO2 Lean was 18.88 ml/kg of lean body weight/min indicating a poor prognosis in heart failure.     The VE/VCO2 [place] was 27.7. The Resting PetCO2 was 27.0.       Severe functional impairment associated with a normal breathing reserve, normal oxygen stauration, an adequate effort, and a reduced AT. These findings are indicative of functional impairment secondary to circulatory insufficiency.         Severe functional impairment associated with a normal breathing reserve, normal oxygen stauration, an adequate effort, and a reduced AT. These findings are indicative of functional impairment secondary to circulatory insufficiency.    The patient's exercise capacity was mildly impaired.    There were no arrhythmias during stress.    The test was stopped because the patient experienced fatigue.    There was no ST segment deviation noted during stress.    ROS       Objective   Vitals:    12/13/24 1326   BP: 105/64   Pulse: 63       Physical Exam  Vitals reviewed.   Constitutional:       General: He is not in  acute distress.     Appearance: He is obese.   HENT:      Head: Atraumatic.   Eyes:      Extraocular Movements: Extraocular movements intact.   Cardiovascular:      Rate and Rhythm: Normal rate and regular rhythm.      Heart sounds: Normal heart sounds.   Pulmonary:      Breath sounds: Normal breath sounds.   Abdominal:      Palpations: Abdomen is soft.      Tenderness: There is no abdominal tenderness.   Musculoskeletal:         General: Normal range of motion.      Right lower leg: No edema.      Left lower leg: No edema.   Neurological:      General: No focal deficit present.      Mental Status: He is alert and oriented to person, place, and time.            Assessment and Plan     1. Chronic combined systolic and diastolic heart failure    2. NICM (nonischemic cardiomyopathy)    3. MYBPC3-related dilated cardiomyopathy-1MM    4. Cardiac resynchronization therapy defibrillator (CRT-D) in place    5. Ventricular tachycardia    6. Hypertension, unspecified type        Plan:  NICM  HFrEF, LVEF 20-25%, LVEDD 6.8 cm  MYBPC3 gene psotivie  S/P CRT-D  HTN  CKD  VT/VF s/p ablation  DVT on AC  - Presents today for FU. Class 1-2 symptoms. Doing well. Euvolemic  - No high risk features on CPX  - Continue current guideline directed medical therapy with metoprolol succinate 25 mg daily, low dose entresto, Farxiga 10 mg daily, and spironolactone 12.5 mg daily.   - Continue Bumex 2 mg daily for volume management.     RTC in 6 months with labs       Advance Care Planning     Date: 12/13/2024    Power of   I initiated the process of voluntary advance care planning today and explained the importance of this process to the patient.  I introduced the concept of advance directives to the patient, as well. Then the patient received detailed information about the importance of designating a Health Care Power of  (HCPOA). He was also instructed to communicate with this person about their wishes for future healthcare,  should he become sick and lose decision-making capacity. The patient has previously appointed a HCPOA.

## 2024-12-13 NOTE — ASSESSMENT & PLAN NOTE
He has had ablation procedures with EP in [place].  He has a defibrillator.  He is scheduled to see electrophysiology soon.    Currently he remains utilizing amiodarone on a chronic basis.

## 2024-12-13 NOTE — PROGRESS NOTES
[facility] Cardiology     Subjective:    Patient ID:  Carroll Yun is a 44 y.o. male who presents for evaluation of NICM, Bi V ICD in Situ, Hypertension, Hyperlipidemia, and Congestive Heart Failure    Review of patient's allergies indicates:  No Known Allergies  He is here to establish care with General Cardiology.  He has nonischemic cardiomyopathy.  He recently moved from [facility] and was getting his care at [facility].  He had a Bi V upgrade December 20, 2023.  He has had VT ablation.  His current EF is 20-25%.  His initial diagnosis of nonischemic cardiomyopathy was made in 2015.    He functions fairly well.  He walks a couple of miles a day.  His GFR is 30 range that has developed in the last couple of years.  Serum creatinine 2.6.  He is tolerating Aldactone Entresto.  He is on Farxiga.  He takes Bumex.  He is on amiodarone.    Prior to his VT ablation he had 7 defibrillator shocks, some with syncope.  He has not seen EP yet.  He moved here recently for social reasons, he has a girlfriend that lives here.  He is lived in [place] in the past.  He is not diabetic.  He is a former smoker.         Review of Systems   Constitutional: Negative for chills, decreased appetite, diaphoresis, fever, malaise/fatigue, night sweats, weight gain and weight loss.   HENT:  Negative for congestion, ear discharge, ear pain, hearing loss, hoarse voice, nosebleeds, odynophagia, sore throat, stridor and tinnitus.    Eyes:  Negative for blurred vision, discharge, double vision, pain, photophobia, redness, vision loss in left eye, vision loss in right eye, visual disturbance and visual halos.   Cardiovascular:  Positive for dyspnea on exertion. Negative for chest pain, claudication, cyanosis, irregular heartbeat, leg swelling, near-syncope, orthopnea, palpitations, paroxysmal nocturnal dyspnea and syncope.   Respiratory:  Negative for cough, hemoptysis,  shortness of breath, sleep disturbances due to breathing, snoring, sputum production and wheezing.    Endocrine: Negative for cold intolerance, heat intolerance, polydipsia, polyphagia and polyuria.   Hematologic/Lymphatic: Negative for adenopathy and bleeding problem. Does not bruise/bleed easily.   Skin:  Negative for color change, dry skin, flushing, itching, nail changes, poor wound healing, rash, skin cancer, suspicious lesions and unusual hair distribution.   Musculoskeletal:  Negative for arthritis, back pain, falls, gout, joint pain, joint swelling, muscle cramps, muscle weakness, myalgias, neck pain and stiffness.   Gastrointestinal:  Negative for bloating, abdominal pain, anorexia, change in bowel habit, bowel incontinence, constipation, diarrhea, dysphagia, excessive appetite, flatus, heartburn, hematemesis, hematochezia, hemorrhoids, jaundice, melena, nausea and vomiting.   Genitourinary:  Negative for bladder incontinence, decreased libido, dysuria, flank pain, frequency, genital sores, hematuria, hesitancy, incomplete emptying, nocturia and urgency.   Neurological:  Negative for aphonia, brief paralysis, difficulty with concentration, disturbances in coordination, excessive daytime sleepiness, dizziness, focal weakness, headaches, light-headedness, loss of balance, numbness, paresthesias, seizures, sensory change, tremors, vertigo and weakness.   Psychiatric/Behavioral:  Negative for altered mental status, depression, hallucinations, memory loss, substance abuse, suicidal ideas and thoughts of violence. The patient does not have insomnia and is not nervous/anxious.    Allergic/Immunologic: Negative for hives and persistent infections.        Objective:       Vitals:    12/13/24 1351   BP: (!) 115/59   Pulse: 60   SpO2: 95%   Weight: 117.3 kg (258 lb 9.6 oz)   Height: 6' (1.829 m)    Physical Exam  Constitutional:       General: He is not in acute distress.     Appearance: He is well-developed. He is  not diaphoretic.   HENT:      Head: Normocephalic and atraumatic.      Nose: Nose normal.   Eyes:      General: No scleral icterus.        Right eye: No discharge.      Conjunctiva/sclera: Conjunctivae normal.      Pupils: Pupils are equal, round, and reactive to light.   Neck:      Thyroid: No thyromegaly.      Vascular: No JVD.      Trachea: No tracheal deviation.   Cardiovascular:      Rate and Rhythm: Normal rate and regular rhythm.      Pulses:           Carotid pulses are 2+ on the right side and 2+ on the left side.       Radial pulses are 2+ on the right side and 2+ on the left side.        Dorsalis pedis pulses are 2+ on the right side and 1+ on the left side.        Posterior tibial pulses are 2+ on the right side and 1+ on the left side.      Heart sounds: Normal heart sounds. No murmur heard.     No friction rub. No gallop.   Pulmonary:      Effort: Pulmonary effort is normal. No respiratory distress.      Breath sounds: Normal breath sounds. No stridor. No wheezing or rales.   Chest:      Chest wall: No tenderness.   Abdominal:      General: Bowel sounds are normal. There is no distension.      Palpations: Abdomen is soft. There is no mass.      Tenderness: There is no abdominal tenderness. There is no guarding or rebound.   Musculoskeletal:         General: No tenderness. Normal range of motion.      Cervical back: Normal range of motion and neck supple.   Lymphadenopathy:      Cervical: No cervical adenopathy.   Skin:     General: Skin is warm and dry.      Coloration: Skin is not pale.      Findings: No erythema or rash.   Neurological:      Mental Status: He is alert and oriented to person, place, and time.      Cranial Nerves: No cranial nerve deficit.      Coordination: Coordination normal.   Psychiatric:         Behavior: Behavior normal.         Thought Content: Thought content normal.         Judgment: Judgment normal.           Assessment:       1. Cardiac resynchronization therapy  defibrillator (CRT-D) in place    2. Congestive heart failure, unspecified HF chronicity, unspecified heart failure type    3. Chronic combined systolic and diastolic heart failure    4. Hypertension, unspecified type    5. Ventricular tachycardia    6. Stage 3b chronic kidney disease    7. NICM (nonischemic cardiomyopathy)    8. History of DVT of lower extremity    9. Hyperlipidemia, unspecified hyperlipidemia type      Results for orders placed or performed in visit on 12/01/24   POCT Urinalysis(Instrument)    Collection Time: 12/01/24 12:55 PM   Result Value Ref Range    Color, POC UA Yellow Yellow, Straw, Colorless    Clarity, POC UA Clear Clear    Glucose, POC UA >=1000 (A) Negative    Bilirubin, POC UA Negative Negative    Ketones, POC UA Negative Negative    Spec Grav POC UA 1.010 1.005 - 1.030    Blood, POC UA Negative Negative    pH, POC UA 5.0 5.0 - 8.0    Protein, POC UA Negative Negative    Urobilinogen, POC UA 0.2 <=1.0    Nitrite, POC UA Negative Negative    WBC, POC UA Negative Negative   POCT Glucose, Hand-Held Device    Collection Time: 12/01/24  1:22 PM   Result Value Ref Range    POC Glucose 116 (A) 70 - 110 MG/DL   Urine culture    Collection Time: 12/01/24  1:29 PM    Specimen: Urine, Clean Catch   Result Value Ref Range    Urine Culture, Routine (A)      VIRIDANS STREPTOCOCCUS GROUP  10,000 - 49,999 cfu/ml  Susceptibility testing not routinely performed           Current Outpatient Medications:     albuterol (PROVENTIL/VENTOLIN HFA) 90 mcg/actuation inhaler, Inhale 1 puff into the lungs every 4 (four) hours as needed., Disp: , Rfl:     amiodarone (PACERONE) 200 MG Tab, Take 200 mg by mouth 2 (two) times daily. (Patient taking differently: Take 200 mg by mouth 2 (two) times daily. Pt states breaks tab in half to take 100 mg daily), Disp: , Rfl:     azelastine (ASTELIN) 137 mcg (0.1 %) nasal spray, 1 spray by Nasal route., Disp: , Rfl:     bumetanide (BUMEX) 2 MG tablet, Take 2 mg by mouth once  daily., Disp: , Rfl:     FARXIGA 10 mg tablet, Take 10 mg by mouth every morning., Disp: , Rfl:     metoprolol succinate (TOPROL-XL) 25 MG 24 hr tablet, Take 25 mg by mouth once daily., Disp: , Rfl:     potassium chloride (K-TAB) 20 mEq, Take 20 mEq by mouth Daily., Disp: , Rfl:     rosuvastatin (CRESTOR) 10 MG tablet, Take 1 tablet by mouth once daily., Disp: , Rfl:     sacubitriL-valsartan (ENTRESTO) 24-26 mg per tablet, Take 1 tablet by mouth 2 (two) times daily., Disp: 180 tablet, Rfl: 3    spironolactone (ALDACTONE) 25 MG tablet, Take 12.5 mg by mouth once daily., Disp: , Rfl:     tamsulosin (FLOMAX) 0.4 mg Cap, Take 0.4 mg by mouth., Disp: , Rfl:     XARELTO 10 mg Tab, Take 10 mg by mouth Daily., Disp: , Rfl:      Lab Results   Component Value Date    WBC 6.88 11/14/2024    RBC 4.89 11/14/2024    HGB 13.7 (L) 11/14/2024    HCT 45.0 11/14/2024    MCV 92 11/14/2024    MCH 28.0 11/14/2024    MCHC 30.4 (L) 11/14/2024    RDW 13.7 11/14/2024     11/14/2024    MPV 9.8 11/14/2024    GRAN 4.2 11/14/2024    GRAN 61.2 11/14/2024    LYMPH 1.9 11/14/2024    LYMPH 26.9 11/14/2024    MONO 0.5 11/14/2024    MONO 7.7 11/14/2024    EOS 0.2 11/14/2024    BASO 0.06 11/14/2024    EOSINOPHIL 3.2 11/14/2024    BASOPHIL 0.9 11/14/2024        CMP  Lab Results   Component Value Date     11/14/2024    K 4.7 11/14/2024     11/14/2024    CO2 24 11/14/2024    GLU 86 11/14/2024    BUN 37 (H) 11/14/2024    CREATININE 2.2 (H) 11/14/2024    CALCIUM 9.0 11/14/2024    PROT 7.5 11/14/2024    ALBUMIN 3.6 11/14/2024    BILITOT 0.8 11/14/2024    ALKPHOS 69 11/14/2024    AST 25 11/14/2024    ALT 24 11/14/2024    ANIONGAP 10 11/14/2024        Lab Results   Component Value Date    LABURIN (A) 12/01/2024     VIRIDANS STREPTOCOCCUS GROUP  10,000 - 49,999 cfu/ml  Susceptibility testing not routinely performed              No results found for this or any previous visit.     X-Ray Abdomen Flat And Erect 12/01/2024 [id number] Final   CPX  Study 11/11/2024 [id number] Final   Echo Saline Bubble? No; Ultrasound enhancing contrast? No 11/01/2024 [id number] Final             Plan:       Problem List Items Addressed This Visit          Cardiac/Vascular    Congestive heart failure    Chronic combined systolic and diastolic heart failure     Current EF 20-25%.  Nonischemic cardiomyopathy.  He has good insight into his medical condition and is compliant with treatment.         HTN (hypertension)     His blood pressure is controlled on current therapy.  He is on low-dose Toprol.         Ventricular tachycardia     He has had ablation procedures with EP in [place].  He has a defibrillator.  He is scheduled to see electrophysiology soon.    Currently he remains utilizing amiodarone on a chronic basis.         NICM (nonischemic cardiomyopathy)     Diagnosed 2015.  He was living in [facility] until recently.         Cardiac resynchronization therapy defibrillator (CRT-D) in place - Primary     Condition stable.  Most recent Electrocardiogram reviewed with 100% V pacing.         Hyperlipidemia     Rosuvastatin 10 mg utilized.  It will be continued.  He is considered primary prevention.  No labs available today for review.            Renal/    Stage 3b chronic kidney disease     Developing years after his cardiomyopathy diagnosis.  He follows with Nephrology.            Hematology    History of DVT of lower extremity     Xarelto 10 mg recommended for DVT prophylaxis long-term.               I advised a four-month follow-up.    He is without volume overload on exam.  No med adjustments required.  Long-term Xarelto 10 mg to be continued.    Current treatment medications to be continued.               Felix Zaman MD  12/13/2024   2:14 PM

## 2024-12-13 NOTE — ASSESSMENT & PLAN NOTE
Rosuvastatin 10 mg utilized.  It will be continued.  He is considered primary prevention.  No labs available today for review.

## 2024-12-13 NOTE — LETTER
December 13, 2024        Lucas Chaudhary  [address]  [address]  [place] [address]  Phone: [phone]  Fax: [phone]             [id number]  [address]  [facility] [address]  Phone: [phone]   Patient: Carroll Yun   MR Number: [id number]   YOB: 1980   Date of Visit: 12/13/2024       Dear Dr. Lucas Chaudhary    Thank you for referring Carroll Yun to me for evaluation. Attached you will find relevant portions of my assessment and plan of care.    If you have questions, please do not hesitate to call me. I look forward to following Carroll Yun along with you.    Sincerely,    Kevin Kessler MD    Enclosure    If you would like to receive this communication electronically, please contact [email or web address] or [phone] to request [employer] access.    [employer] is a tool which provides read-only access to select patient information with whom you have a relationship. Its easy to use and provides real time access to review your patients record including encounter summaries, notes, results, and demographic information.    If you feel you have received this communication in error or would no longer like to receive these types of communications, please e-mail [email or web address]

## 2024-12-13 NOTE — ASSESSMENT & PLAN NOTE
Current EF 20-25%.  Nonischemic cardiomyopathy.  He has good insight into his medical condition and is compliant with treatment.